# Patient Record
Sex: MALE | Race: WHITE | NOT HISPANIC OR LATINO | Employment: FULL TIME | URBAN - METROPOLITAN AREA
[De-identification: names, ages, dates, MRNs, and addresses within clinical notes are randomized per-mention and may not be internally consistent; named-entity substitution may affect disease eponyms.]

---

## 2022-01-25 ENCOUNTER — CLINICAL SUPPORT (OUTPATIENT)
Dept: FAMILY MEDICINE CLINIC | Facility: CLINIC | Age: 37
End: 2022-01-25

## 2022-01-25 DIAGNOSIS — Z20.828 EXPOSURE TO SARS-ASSOCIATED CORONAVIRUS: Primary | ICD-10-CM

## 2022-01-25 LAB — SARS-COV-2 RNA PNL SPEC NAA+PROBE: NOT DETECTED

## 2022-01-25 PROCEDURE — U0004 COV-19 TEST NON-CDC HGH THRU: HCPCS | Performed by: FAMILY MEDICINE

## 2022-01-25 PROCEDURE — 99211 OFF/OP EST MAY X REQ PHY/QHP: CPT | Performed by: FAMILY MEDICINE

## 2022-09-21 ENCOUNTER — OFFICE VISIT (OUTPATIENT)
Dept: FAMILY MEDICINE CLINIC | Facility: CLINIC | Age: 37
End: 2022-09-21

## 2022-09-21 VITALS — HEART RATE: 95 BPM | TEMPERATURE: 97.2 F | OXYGEN SATURATION: 98 %

## 2022-09-21 DIAGNOSIS — B34.9 VIRAL ILLNESS: Primary | ICD-10-CM

## 2022-09-21 DIAGNOSIS — J18.9 PNEUMONITIS: ICD-10-CM

## 2022-09-21 PROBLEM — R79.89 ELEVATED LFTS: Status: ACTIVE | Noted: 2021-01-20

## 2022-09-21 LAB
EXPIRATION DATE: NORMAL
INTERNAL CONTROL: NORMAL
Lab: NORMAL
SARS-COV-2 AG UPPER RESP QL IA.RAPID: NOT DETECTED

## 2022-09-21 PROCEDURE — 99213 OFFICE O/P EST LOW 20 MIN: CPT | Performed by: FAMILY MEDICINE

## 2022-09-21 PROCEDURE — 87426 SARSCOV CORONAVIRUS AG IA: CPT | Performed by: FAMILY MEDICINE

## 2022-09-21 PROCEDURE — U0004 COV-19 TEST NON-CDC HGH THRU: HCPCS | Performed by: FAMILY MEDICINE

## 2022-09-21 RX ORDER — DEXTROMETHORPHAN HYDROBROMIDE AND PROMETHAZINE HYDROCHLORIDE 15; 6.25 MG/5ML; MG/5ML
5 SYRUP ORAL NIGHTLY PRN
Qty: 118 ML | Refills: 0 | Status: SHIPPED | OUTPATIENT
Start: 2022-09-21

## 2022-09-21 RX ORDER — PREDNISONE 10 MG/1
30 TABLET ORAL DAILY
Qty: 15 TABLET | Refills: 0 | Status: SHIPPED | OUTPATIENT
Start: 2022-09-21 | End: 2022-09-26

## 2022-09-21 RX ORDER — ESCITALOPRAM OXALATE 10 MG/1
10 TABLET ORAL DAILY
COMMUNITY
Start: 2022-05-05

## 2022-09-21 RX ORDER — BENZONATATE 200 MG/1
200 CAPSULE ORAL 3 TIMES DAILY PRN
Qty: 30 CAPSULE | Refills: 0 | Status: SHIPPED | OUTPATIENT
Start: 2022-09-21

## 2022-09-21 RX ORDER — AMOXICILLIN AND CLAVULANATE POTASSIUM 500; 125 MG/1; MG/1
1 TABLET, FILM COATED ORAL 2 TIMES DAILY
Qty: 10 TABLET | Refills: 0 | Status: SHIPPED | OUTPATIENT
Start: 2022-09-21 | End: 2022-09-26

## 2022-09-21 NOTE — PROGRESS NOTES
Chief Complaint    Cough (Cough/fatigue x 3 days)    Subjective      Alvarez Muñoz presents to [facility]    History of Present Illness    1.) ACUTE ILLNESS : Onset - 3 days ago. Smoker x 20 years - 1 PPD.  The patient presents with complaint of cough and fatigue.  He is not complaining of any fevers or chills.  He is not complaining of any loss of taste or smell.  No complaint of SOA.     Objective     Vital Signs:     Pulse 95   Temp 97.2 °F (36.2 °C) (Temporal)   SpO2 98%       Physical Exam  Vitals reviewed.   Constitutional:       General: He is not in acute distress.     Appearance: Normal appearance. He is well-developed.   HENT:      Head: Normocephalic and atraumatic.      Right Ear: Hearing and external ear normal.      Left Ear: Hearing and external ear normal.      Nose: Nose normal.   Eyes:      General: Lids are normal.         Right eye: No discharge.         Left eye: No discharge.      Conjunctiva/sclera: Conjunctivae normal.   Pulmonary:      Effort: Pulmonary effort is normal. No respiratory distress.      Breath sounds: No stridor. No wheezing or rhonchi.   Abdominal:      General: There is no distension.   Musculoskeletal:         General: No swelling.      Cervical back: Neck supple.   Skin:     Coloration: Skin is not jaundiced.      Findings: No erythema.   Neurological:      Mental Status: He is alert. Mental status is at baseline.   Psychiatric:         Mood and Affect: Mood and affect normal.         Thought Content: Thought content normal.     Assessment and Plan     Diagnoses and all orders for this visit:    1. Viral illness (Primary)  -     POCT SARS-CoV-2 Antigen YOLI    2. Pneumonitis  -     COVID-19,APTIMA PANTHER(TIMI), NARESH/ THOMAS, NP/OP SWAB IN UTM/VTM/[facility] TRANSPORT MEDIA,24 HR TAT - Swab, Nasopharynx    Other orders  -     promethazine-dextromethorphan (PROMETHAZINE-DM) 6.25-15 MG/5ML syrup; Take 5 mL by mouth At Night As Needed for Cough.   Dispense: 118 mL; Refill: 0  -     benzonatate (TESSALON) 200 MG capsule; Take 1 capsule by mouth 3 (Three) Times a Day As Needed for Cough.  Dispense: 30 capsule; Refill: 0  -     predniSONE (DELTASONE) 10 MG tablet; Take 3 tablets by mouth Daily for 5 days.  Dispense: 15 tablet; Refill: 0  -     amoxicillin-clavulanate (Augmentin) 500-125 MG per tablet; Take 1 tablet by mouth 2 (Two) Times a Day for 5 days.  Dispense: 10 tablet; Refill: 0    · Rapid COVID testing here in office negative.  PCR sent.  Quarantine and precautions guidelines discussed with patient.  Do not return to work until COVID is resulted.  Due to history of smoking start steroid course as noted.  Empiric ABX as noted.  Antitussives as needed.  Adequate fluids and rest.  Additional recommendation per review of COVID testing.    Follow Up : As needed.    Patient was given instructions and counseling regarding his condition or for health maintenance advice. Please see specific information pulled into the AVS if appropriate.

## 2022-09-22 LAB — SARS-COV-2 RNA PNL SPEC NAA+PROBE: DETECTED
